# Patient Record
Sex: MALE | Race: WHITE | Employment: OTHER | URBAN - METROPOLITAN AREA
[De-identification: names, ages, dates, MRNs, and addresses within clinical notes are randomized per-mention and may not be internally consistent; named-entity substitution may affect disease eponyms.]

---

## 2020-08-31 ENCOUNTER — APPOINTMENT (OUTPATIENT)
Dept: ULTRASOUND IMAGING | Facility: CLINIC | Age: 75
DRG: 728 | End: 2020-08-31
Attending: NURSE PRACTITIONER
Payer: MEDICARE

## 2020-08-31 ENCOUNTER — APPOINTMENT (OUTPATIENT)
Dept: CT IMAGING | Facility: CLINIC | Age: 75
DRG: 728 | End: 2020-08-31
Attending: NURSE PRACTITIONER
Payer: MEDICARE

## 2020-08-31 ENCOUNTER — HOSPITAL ENCOUNTER (INPATIENT)
Facility: CLINIC | Age: 75
LOS: 2 days | Discharge: HOME | DRG: 728 | End: 2020-09-02
Attending: NURSE PRACTITIONER | Admitting: STUDENT IN AN ORGANIZED HEALTH CARE EDUCATION/TRAINING PROGRAM
Payer: MEDICARE

## 2020-08-31 DIAGNOSIS — N49.2 SCROTAL ABSCESS: ICD-10-CM

## 2020-08-31 DIAGNOSIS — N17.9 ACUTE KIDNEY INJURY (H): ICD-10-CM

## 2020-08-31 DIAGNOSIS — A41.9 SEPSIS (H): ICD-10-CM

## 2020-08-31 DIAGNOSIS — I95.9 HYPOTENSION: ICD-10-CM

## 2020-08-31 PROBLEM — Z86.007 HISTORY OF SQUAMOUS CELL CARCINOMA IN SITU (SCCIS) OF SKIN: Status: ACTIVE | Noted: 2017-08-30

## 2020-08-31 PROBLEM — F33.0 MAJOR DEPRESSIVE DISORDER, RECURRENT EPISODE, MILD (H): Status: ACTIVE | Noted: 2018-01-03

## 2020-08-31 LAB
ALBUMIN UR-MCNC: NEGATIVE MG/DL
ANION GAP SERPL CALCULATED.3IONS-SCNC: 7 MMOL/L (ref 3–14)
APPEARANCE UR: CLEAR
BACTERIA #/AREA URNS HPF: ABNORMAL /HPF
BASOPHILS # BLD AUTO: 0.1 10E9/L (ref 0–0.2)
BASOPHILS NFR BLD AUTO: 0.7 %
BILIRUB UR QL STRIP: NEGATIVE
BUN SERPL-MCNC: 19 MG/DL (ref 7–30)
CALCIUM SERPL-MCNC: 9.8 MG/DL (ref 8.5–10.1)
CHLORIDE SERPL-SCNC: 109 MMOL/L (ref 94–109)
CO2 SERPL-SCNC: 26 MMOL/L (ref 20–32)
COLOR UR AUTO: YELLOW
CREAT SERPL-MCNC: 1.31 MG/DL (ref 0.66–1.25)
CRP SERPL-MCNC: 15.6 MG/L (ref 0–8)
DIFFERENTIAL METHOD BLD: ABNORMAL
EOSINOPHIL # BLD AUTO: 0.1 10E9/L (ref 0–0.7)
EOSINOPHIL NFR BLD AUTO: 1.2 %
ERYTHROCYTE [DISTWIDTH] IN BLOOD BY AUTOMATED COUNT: 12.4 % (ref 10–15)
ERYTHROCYTE [SEDIMENTATION RATE] IN BLOOD BY WESTERGREN METHOD: 49 MM/H (ref 0–20)
GFR SERPL CREATININE-BSD FRML MDRD: 53 ML/MIN/{1.73_M2}
GLUCOSE BLDC GLUCOMTR-MCNC: 147 MG/DL (ref 70–99)
GLUCOSE SERPL-MCNC: 151 MG/DL (ref 70–99)
GLUCOSE UR STRIP-MCNC: NEGATIVE MG/DL
HCT VFR BLD AUTO: 40.6 % (ref 40–53)
HGB BLD-MCNC: 13.9 G/DL (ref 13.3–17.7)
HGB UR QL STRIP: NEGATIVE
IMM GRANULOCYTES # BLD: 0 10E9/L (ref 0–0.4)
IMM GRANULOCYTES NFR BLD: 0.5 %
INTERPRETATION ECG - MUSE: NORMAL
KETONES UR STRIP-MCNC: 5 MG/DL
LACTATE BLD-SCNC: 2.2 MMOL/L (ref 0.7–2)
LACTATE BLD-SCNC: 3 MMOL/L (ref 0.7–2)
LEUKOCYTE ESTERASE UR QL STRIP: NEGATIVE
LYMPHOCYTES # BLD AUTO: 1.4 10E9/L (ref 0.8–5.3)
LYMPHOCYTES NFR BLD AUTO: 18.7 %
MCH RBC QN AUTO: 32.7 PG (ref 26.5–33)
MCHC RBC AUTO-ENTMCNC: 34.2 G/DL (ref 31.5–36.5)
MCV RBC AUTO: 96 FL (ref 78–100)
MONOCYTES # BLD AUTO: 1 10E9/L (ref 0–1.3)
MONOCYTES NFR BLD AUTO: 13 %
MUCOUS THREADS #/AREA URNS LPF: PRESENT /LPF
NEUTROPHILS # BLD AUTO: 4.8 10E9/L (ref 1.6–8.3)
NEUTROPHILS NFR BLD AUTO: 65.9 %
NITRATE UR QL: NEGATIVE
NRBC # BLD AUTO: 0 10*3/UL
NRBC BLD AUTO-RTO: 0 /100
PH UR STRIP: 5 PH (ref 5–7)
PLATELET # BLD AUTO: 247 10E9/L (ref 150–450)
POTASSIUM SERPL-SCNC: 4.4 MMOL/L (ref 3.4–5.3)
PROCALCITONIN SERPL-MCNC: 0.11 NG/ML
RBC # BLD AUTO: 4.25 10E12/L (ref 4.4–5.9)
RBC #/AREA URNS AUTO: 1 /HPF (ref 0–2)
SODIUM SERPL-SCNC: 142 MMOL/L (ref 133–144)
SOURCE: ABNORMAL
SP GR UR STRIP: 1.02 (ref 1–1.03)
UROBILINOGEN UR STRIP-MCNC: NORMAL MG/DL (ref 0–2)
WBC # BLD AUTO: 7.3 10E9/L (ref 4–11)
WBC #/AREA URNS AUTO: 4 /HPF (ref 0–5)

## 2020-08-31 PROCEDURE — BW211ZZ COMPUTERIZED TOMOGRAPHY (CT SCAN) OF ABDOMEN AND PELVIS USING LOW OSMOLAR CONTRAST: ICD-10-PCS | Performed by: NURSE PRACTITIONER

## 2020-08-31 PROCEDURE — 25000128 H RX IP 250 OP 636: Performed by: NURSE PRACTITIONER

## 2020-08-31 PROCEDURE — 81001 URINALYSIS AUTO W/SCOPE: CPT | Performed by: NURSE PRACTITIONER

## 2020-08-31 PROCEDURE — 00000146 ZZHCL STATISTIC GLUCOSE BY METER IP

## 2020-08-31 PROCEDURE — 25000125 ZZHC RX 250: Performed by: NURSE PRACTITIONER

## 2020-08-31 PROCEDURE — 96375 TX/PRO/DX INJ NEW DRUG ADDON: CPT

## 2020-08-31 PROCEDURE — 96361 HYDRATE IV INFUSION ADD-ON: CPT

## 2020-08-31 PROCEDURE — 25800030 ZZH RX IP 258 OP 636: Performed by: NURSE PRACTITIONER

## 2020-08-31 PROCEDURE — 85652 RBC SED RATE AUTOMATED: CPT | Performed by: NURSE PRACTITIONER

## 2020-08-31 PROCEDURE — 25800030 ZZH RX IP 258 OP 636: Performed by: STUDENT IN AN ORGANIZED HEALTH CARE EDUCATION/TRAINING PROGRAM

## 2020-08-31 PROCEDURE — 74177 CT ABD & PELVIS W/CONTRAST: CPT

## 2020-08-31 PROCEDURE — C9803 HOPD COVID-19 SPEC COLLECT: HCPCS

## 2020-08-31 PROCEDURE — 83605 ASSAY OF LACTIC ACID: CPT | Performed by: NURSE PRACTITIONER

## 2020-08-31 PROCEDURE — 25000132 ZZH RX MED GY IP 250 OP 250 PS 637: Performed by: NURSE PRACTITIONER

## 2020-08-31 PROCEDURE — 80048 BASIC METABOLIC PNL TOTAL CA: CPT | Performed by: NURSE PRACTITIONER

## 2020-08-31 PROCEDURE — 96365 THER/PROPH/DIAG IV INF INIT: CPT | Mod: 59

## 2020-08-31 PROCEDURE — 12000000 ZZH R&B MED SURG/OB

## 2020-08-31 PROCEDURE — 85025 COMPLETE CBC W/AUTO DIFF WBC: CPT | Performed by: NURSE PRACTITIONER

## 2020-08-31 PROCEDURE — 10060 I&D ABSCESS SIMPLE/SINGLE: CPT

## 2020-08-31 PROCEDURE — 93005 ELECTROCARDIOGRAM TRACING: CPT

## 2020-08-31 PROCEDURE — 25000128 H RX IP 250 OP 636: Performed by: STUDENT IN AN ORGANIZED HEALTH CARE EDUCATION/TRAINING PROGRAM

## 2020-08-31 PROCEDURE — 76870 US EXAM SCROTUM: CPT

## 2020-08-31 PROCEDURE — 84145 PROCALCITONIN (PCT): CPT | Performed by: NURSE PRACTITIONER

## 2020-08-31 PROCEDURE — 87040 BLOOD CULTURE FOR BACTERIA: CPT | Performed by: NURSE PRACTITIONER

## 2020-08-31 PROCEDURE — 86140 C-REACTIVE PROTEIN: CPT | Performed by: NURSE PRACTITIONER

## 2020-08-31 PROCEDURE — 99285 EMERGENCY DEPT VISIT HI MDM: CPT | Mod: 25

## 2020-08-31 PROCEDURE — 99223 1ST HOSP IP/OBS HIGH 75: CPT | Mod: AI | Performed by: STUDENT IN AN ORGANIZED HEALTH CARE EDUCATION/TRAINING PROGRAM

## 2020-08-31 PROCEDURE — U0003 INFECTIOUS AGENT DETECTION BY NUCLEIC ACID (DNA OR RNA); SEVERE ACUTE RESPIRATORY SYNDROME CORONAVIRUS 2 (SARS-COV-2) (CORONAVIRUS DISEASE [COVID-19]), AMPLIFIED PROBE TECHNIQUE, MAKING USE OF HIGH THROUGHPUT TECHNOLOGIES AS DESCRIBED BY CMS-2020-01-R: HCPCS | Performed by: NURSE PRACTITIONER

## 2020-08-31 RX ORDER — MULTIPLE VITAMINS W/ MINERALS TAB 9MG-400MCG
1 TAB ORAL DAILY
COMMUNITY

## 2020-08-31 RX ORDER — PIPERACILLIN SODIUM, TAZOBACTAM SODIUM 4; .5 G/20ML; G/20ML
4.5 INJECTION, POWDER, LYOPHILIZED, FOR SOLUTION INTRAVENOUS ONCE
Status: COMPLETED | OUTPATIENT
Start: 2020-08-31 | End: 2020-08-31

## 2020-08-31 RX ORDER — ASPIRIN 81 MG/1
81 TABLET ORAL DAILY
COMMUNITY

## 2020-08-31 RX ORDER — ACETAMINOPHEN 325 MG/1
650 TABLET ORAL EVERY 4 HOURS PRN
Status: DISCONTINUED | OUTPATIENT
Start: 2020-08-31 | End: 2020-09-02 | Stop reason: HOSPADM

## 2020-08-31 RX ORDER — SODIUM CHLORIDE, SODIUM LACTATE, POTASSIUM CHLORIDE, CALCIUM CHLORIDE 600; 310; 30; 20 MG/100ML; MG/100ML; MG/100ML; MG/100ML
INJECTION, SOLUTION INTRAVENOUS CONTINUOUS
Status: DISCONTINUED | OUTPATIENT
Start: 2020-08-31 | End: 2020-09-02 | Stop reason: HOSPADM

## 2020-08-31 RX ORDER — FERROUS GLUCONATE 324(37.5)
324 TABLET ORAL DAILY
COMMUNITY

## 2020-08-31 RX ORDER — PROCHLORPERAZINE MALEATE 5 MG
5 TABLET ORAL EVERY 6 HOURS PRN
Status: DISCONTINUED | OUTPATIENT
Start: 2020-08-31 | End: 2020-09-02 | Stop reason: HOSPADM

## 2020-08-31 RX ORDER — IBUPROFEN 200 MG
400 TABLET ORAL 2 TIMES DAILY WITH MEALS
COMMUNITY

## 2020-08-31 RX ORDER — HYDROMORPHONE HYDROCHLORIDE 1 MG/ML
0.5 INJECTION, SOLUTION INTRAMUSCULAR; INTRAVENOUS; SUBCUTANEOUS ONCE
Status: DISCONTINUED | OUTPATIENT
Start: 2020-08-31 | End: 2020-08-31

## 2020-08-31 RX ORDER — MULTIVIT-MIN/IRON/FOLIC ACID/K 18-600-40
1000 CAPSULE ORAL DAILY
COMMUNITY

## 2020-08-31 RX ORDER — ESCITALOPRAM OXALATE 10 MG/1
10 TABLET ORAL DAILY
COMMUNITY
Start: 2020-07-15

## 2020-08-31 RX ORDER — DEXTROSE MONOHYDRATE 25 G/50ML
25-50 INJECTION, SOLUTION INTRAVENOUS
Status: DISCONTINUED | OUTPATIENT
Start: 2020-08-31 | End: 2020-09-02 | Stop reason: HOSPADM

## 2020-08-31 RX ORDER — LIDOCAINE 40 MG/G
CREAM TOPICAL
Status: DISCONTINUED | OUTPATIENT
Start: 2020-08-31 | End: 2020-09-02 | Stop reason: HOSPADM

## 2020-08-31 RX ORDER — NALOXONE HYDROCHLORIDE 0.4 MG/ML
.1-.4 INJECTION, SOLUTION INTRAMUSCULAR; INTRAVENOUS; SUBCUTANEOUS
Status: DISCONTINUED | OUTPATIENT
Start: 2020-08-31 | End: 2020-09-02 | Stop reason: HOSPADM

## 2020-08-31 RX ORDER — ONDANSETRON 4 MG/1
4 TABLET, ORALLY DISINTEGRATING ORAL EVERY 6 HOURS PRN
Status: DISCONTINUED | OUTPATIENT
Start: 2020-08-31 | End: 2020-09-02 | Stop reason: HOSPADM

## 2020-08-31 RX ORDER — OXYCODONE HYDROCHLORIDE 5 MG/1
5-10 TABLET ORAL EVERY 4 HOURS PRN
Status: DISCONTINUED | OUTPATIENT
Start: 2020-08-31 | End: 2020-09-02 | Stop reason: HOSPADM

## 2020-08-31 RX ORDER — PIPERACILLIN SODIUM, TAZOBACTAM SODIUM 3; .375 G/15ML; G/15ML
3.38 INJECTION, POWDER, LYOPHILIZED, FOR SOLUTION INTRAVENOUS EVERY 6 HOURS
Status: DISCONTINUED | OUTPATIENT
Start: 2020-08-31 | End: 2020-09-01

## 2020-08-31 RX ORDER — IOPAMIDOL 755 MG/ML
111 INJECTION, SOLUTION INTRAVASCULAR ONCE
Status: COMPLETED | OUTPATIENT
Start: 2020-08-31 | End: 2020-08-31

## 2020-08-31 RX ORDER — ATORVASTATIN CALCIUM 10 MG/1
10 TABLET, FILM COATED ORAL DAILY
COMMUNITY
Start: 2020-01-22

## 2020-08-31 RX ORDER — ONDANSETRON 2 MG/ML
4 INJECTION INTRAMUSCULAR; INTRAVENOUS EVERY 6 HOURS PRN
Status: DISCONTINUED | OUTPATIENT
Start: 2020-08-31 | End: 2020-09-02 | Stop reason: HOSPADM

## 2020-08-31 RX ORDER — PROCHLORPERAZINE 25 MG
12.5 SUPPOSITORY, RECTAL RECTAL EVERY 12 HOURS PRN
Status: DISCONTINUED | OUTPATIENT
Start: 2020-08-31 | End: 2020-09-02 | Stop reason: HOSPADM

## 2020-08-31 RX ORDER — ACETAMINOPHEN 500 MG
1000 TABLET ORAL ONCE
Status: COMPLETED | OUTPATIENT
Start: 2020-08-31 | End: 2020-08-31

## 2020-08-31 RX ORDER — NICOTINE POLACRILEX 4 MG
15-30 LOZENGE BUCCAL
Status: DISCONTINUED | OUTPATIENT
Start: 2020-08-31 | End: 2020-09-02 | Stop reason: HOSPADM

## 2020-08-31 RX ADMIN — IOPAMIDOL 111 ML: 755 INJECTION, SOLUTION INTRAVENOUS at 17:30

## 2020-08-31 RX ADMIN — ACETAMINOPHEN 1000 MG: 500 TABLET, FILM COATED ORAL at 15:34

## 2020-08-31 RX ADMIN — SODIUM CHLORIDE 1000 ML: 9 INJECTION, SOLUTION INTRAVENOUS at 17:58

## 2020-08-31 RX ADMIN — SODIUM CHLORIDE, POTASSIUM CHLORIDE, SODIUM LACTATE AND CALCIUM CHLORIDE: 600; 310; 30; 20 INJECTION, SOLUTION INTRAVENOUS at 23:29

## 2020-08-31 RX ADMIN — PIPERACILLIN SODIUM AND TAZOBACTAM SODIUM 3.38 G: 3; .375 INJECTION, POWDER, LYOPHILIZED, FOR SOLUTION INTRAVENOUS at 22:41

## 2020-08-31 RX ADMIN — PIPERACILLIN SODIUM AND TAZOBACTAM SODIUM 4.5 G: 4; .5 INJECTION, POWDER, LYOPHILIZED, FOR SOLUTION INTRAVENOUS at 16:27

## 2020-08-31 RX ADMIN — SODIUM CHLORIDE 72 ML: 9 INJECTION, SOLUTION INTRAVENOUS at 17:29

## 2020-08-31 RX ADMIN — SODIUM CHLORIDE 1000 ML: 9 INJECTION, SOLUTION INTRAVENOUS at 15:50

## 2020-08-31 RX ADMIN — VANCOMYCIN HYDROCHLORIDE 2000 MG: 5 INJECTION, POWDER, LYOPHILIZED, FOR SOLUTION INTRAVENOUS at 17:11

## 2020-08-31 SDOH — HEALTH STABILITY: MENTAL HEALTH: HOW OFTEN DO YOU HAVE A DRINK CONTAINING ALCOHOL?: 2-4 TIMES A MONTH

## 2020-08-31 ASSESSMENT — ENCOUNTER SYMPTOMS
MYALGIAS: 0
DIFFICULTY URINATING: 0
FREQUENCY: 0
DYSURIA: 0
HEMATURIA: 0
FEVER: 0

## 2020-08-31 ASSESSMENT — ACTIVITIES OF DAILY LIVING (ADL)
AMBULATION: 0-->INDEPENDENT
BATHING: 0-->INDEPENDENT
TOILETING: 0-->INDEPENDENT
FALL_HISTORY_WITHIN_LAST_SIX_MONTHS: NO
RETIRED_EATING: 0-->INDEPENDENT
RETIRED_COMMUNICATION: 0-->UNDERSTANDS/COMMUNICATES WITHOUT DIFFICULTY
TRANSFERRING: 0-->INDEPENDENT
COGNITION: 0 - NO COGNITION ISSUES REPORTED
DRESS: 0-->INDEPENDENT
SWALLOWING: 0-->SWALLOWS FOODS/LIQUIDS WITHOUT DIFFICULTY

## 2020-08-31 NOTE — ED TRIAGE NOTES
Wound check in the groin area. It looks worse today and primary would like it to be evaluated for increase risk of infection, I and D or IV abx.

## 2020-08-31 NOTE — ED PROVIDER NOTES
"  History     Chief Complaint:  Wound Check      HPI   Luc Amaya is a 74 year old male with a history of DM type 2 who presents for evaluation of a wound check. Patient reports having a sore on the left side of his scrotum that is painful that started two weeks ago. He presented in clinic four days ago and was started on Augmentin. Today he reports bleeding and drainage from the wound, prompting him to present to the ED. He denies fever, myalgias, and urinary changes.    Allergies:  No known drug allergies    Medications:    Augmentin  Metformin  Lexapro  Atorvastatin    Past Medical History:    HLD  Cellulitis of scrotum  SCCIS of skin  DM type 2  Obesity  GERD  MDD  Presbyacusis of both ears  Kidney stones  Erectile dysfunction    Past Surgical History:    Tonsillectomy  Right thumb pinning    Family History:    Diabetes - father  AAA - father  CAD - mother  Stroke - father  Depression - mother    Social History:  Smoking status: former smoker  Alcohol use: yes  Drug use: no  The patient presents to the emergency department by himself.  Marital Status:       Review of Systems   Constitutional: Negative for fever.   Genitourinary: Positive for scrotal swelling (Pain). Negative for decreased urine volume, difficulty urinating, dysuria, enuresis, frequency, hematuria and urgency.   Musculoskeletal: Negative for myalgias.   All other systems reviewed and are negative.      Physical Exam     Patient Vitals for the past 24 hrs:   BP Temp Temp src Pulse Resp SpO2 Height Weight   08/31/20 1715 93/65 -- -- -- -- 95 % -- --   08/31/20 1710 101/65 -- -- 77 -- 94 % -- --   08/31/20 1615 106/71 -- -- 80 -- 94 % -- --   08/31/20 1600 98/65 -- -- 81 -- 93 % -- --   08/31/20 1555 -- -- -- -- -- 93 % -- --   08/31/20 1550 -- -- -- -- -- 93 % -- --   08/31/20 1545 (!) 87/65 -- -- 88 -- 94 % -- --   08/31/20 1540 -- -- -- -- -- 95 % -- --   08/31/20 1535 (!) 80/62 -- -- 95 -- 94 % 1.676 m (5' 6\") --   08/31/20 9213 (!) " 80/62 -- -- -- -- -- -- --   08/31/20 1445 -- 97.5  F (36.4  C) Oral -- -- -- -- --   08/31/20 1443 -- -- -- -- -- -- -- 99.8 kg (220 lb)   08/31/20 1442 102/47 -- -- -- -- -- -- --   08/31/20 1441 -- -- Oral 95 14 98 % -- --       Physical Exam  Nursing notes reviewed. Vitals reviewed.  General: Alert. Well kept.  Eyes:  Conjunctiva non-injected, non-icteric.  Neck/Throat: Moist mucous membranes. Normal voice.  Cardiac: Regular rhythm. Normal heart sounds.  Pulmonary: Clear and equal breath sounds bilaterally.   Abdomen: soft and non-tender  : evaluation performed in presence of chaperone. Circumcized, no penile discharge or rash/sores. No right testicular/scrotal tenderness. 3-4cm firm induration left upper scrotum and left perineal area with no left testicle pain. Tenderness to palpation along left epididymus.   Musculoskeletal: Normal gross range of motion of all 4 extremities.   Neurological: Alert and oriented x4.   Skin: Warm and dry. Normal appearance of visualized exposed skin without rashes or petechiae.  Psych: Affect normal. Good eye contact.      Emergency Department Course   ECG (16:07:00):  Rate 78 bpm. TX interval 148. QRS duration 56. QT/QTc 394/449. P-R-T axes 44 38 75. Normal sinus rhythm. Normal ECG. Interpreted at 1610 by Jonah Booth MD.    Imaging:  Radiographic findings were communicated with the patient who voiced understanding of the findings.    CT Abdomen Pelvis w Contrast   Final Result   IMPRESSION:    1.  Probable left scrotal wall abscess. No gas is noted in the soft   tissues to suggest gangrenous infection. Findings are consistent with   recent testicular ultrasound findings. No intra-abdominal or   intraperitoneal abnormality associated with this infection.      2.  Prominent upper abdominal lymph nodes and borderline-enlarged left   inguinal lymph node. Inguinal lymph node is likely reactive in nature.      3.  Cholelithiasis. Probable collapsing cyst lateral segment left    hepatic lobe.      4.  Simple-appearing cyst left renal cortex. No further follow-up   required.      5.  Colonic constipation but no bowel obstruction or diverticulitis.      VU GAMEZ MD      US Testicular & Scrotum w Doppler Ltd   Final Result   IMPRESSION:     1. Normal testicles without torsion or mass. No epididymitis or   hydroceles.   2. Probable small abscess lateral left scrotal wall with possible   communication to the skin surface.      VU GAMEZ MD          Laboratory:  CBC: WBC: 7.3, HGB: 13.9, PLT: 247  BMP: Glucose 151 (H), GFR 53 (L), o/w WNL (Creatinine: 1.31 (H))  1526 Lactic acid: 3.0 (H)  1744 Lactic acid: 2.2 (H)  Blood cultures (X2): Pending  UA: Pending  CRP inflammation: 15.6 (H)  Erythrocyte sedimentation rate auto: 49 (H)  Procalcitonin: 0.11  Asymptomatic COVID-19 PCR: Pending    Interventions:  1535 Tylenol 1000 mg Oral  1550 NS 1000 mL IV  1627 Zosyn 4.5 g IV  1711 vancomycin 2000 mg IV  1758 NS 1000 mL IV      Procedure: Incision and Drainage   Performed by: Dr. Jonah Booth    LOCATIONS:  Left lateral scrotal wall     ANESTHESIA:  Local field block using Lidocaine 1% plain, total of 4 mLs     PREPARATION:  Cleansed with Normal Saline and Betadine     PROCEDURE:  Area was incised with # 11 Blade ([place]) with a Single Straight incision.  Wound treatment included Deloculation and Purulent Drainage.  Packing consisted of Plain Gauze.  Appropriate dressing was applied to cover the area.    Patient Status:        Patient tolerated the procedure well. There were no complications.      Emergency Department Course:  Nursing notes and vitals reviewed. (1513) I performed an exam of the patient as documented above.     IV inserted. Medicine administered as documented above. Blood drawn. Urine sample obtained. COVID swab obtained. This was sent to the lab for further testing, results above.     The patient was sent for a US while in the emergency department, findings above.     An EKG was  recorded. Results as noted above.     1707 I spoke with Dr. Vini Paz of [facility] and discussed the patient    1845 I rechecked the patient and discussed the results of his workup thus far.     1900 I spoke with Dr. Vini Paz of [facility] and discussed the patient who recommended local I&D be completed in the ED.    1900  I discussed the patient with Dr. Booth in shared service.     1910  I consulted with Dr. Poole of the hospitalist services. He is in agreement to accept the patient for admission.    Findings and plan explained to the Patient who consents to admission. Discussed the patient with Dr. Perez, who will admit the patient to a medical bed for further monitoring, evaluation, and treatment.    Impression & Plan    CMS Diagnoses: The Lactic acid level is elevated due to Infection, at this time there is no sign of severe sepsis or septic shock. Resulted at 1538.       Medical Decision Making:  Luc Amaya is a 74 year old male with a history of DM type 2 who presents for evaluation of a wound check.  On exam he has a area of induration that is currently draining on the left lateral scrotum.  Initial WBC 7.3, but lactate is 3.0 and patient has hypotension with blood pressure in the upper 80s systolic.  Blood pressure improved to greater than 100 systolic with IV fluids, but returned to the 90s systolic and second liter of IV fluids was given with blood pressure again greater than 100 systolic.  Blood cultures obtained and the patient was initiated on Zosyn and vancomycin.  Repeat lactate returns at 2.2.  Ultrasound concerning for scrotal abscess.  I spoke with Dr. Paz from urology who recommended CT scan.  This returned showing focal inflammation noted in the left scrotal wall extending into the perineal region. 2.4 cm fluid collection noted consistent with a scrotal wall/perineal abscess.  I again spoke with Dr. Paz who reviewed imaging and recommended I&D and admission.  I & D performed  without complication (see procedure note above).  Urology will plan to see the patient inpatient.  I spoke with Dr. Poole,  hospitalist who accepts for admission.     Covid-19  Luc Amaya was evaluated during a global COVID-19 pandemic, which necessitated consideration that the patient might be at risk for infection with the SARS-CoV-2 virus that causes COVID-19.   Applicable protocols for evaluation were followed during the patient's care.   COVID-19 was considered as part of the patient's evaluation. The plan for testing is:  a test was obtained during this visit.    Diagnosis:    ICD-10-CM    1. Acute kidney injury (H)  N17.9 Asymptomatic COVID-19 Virus (Coronavirus) by PCR     CRP inflammation     CRP inflammation     Procalcitonin     Procalcitonin     Erythrocyte sedimentation rate auto     Erythrocyte sedimentation rate auto     Lactic acid     CANCELED: CRP inflammation     CANCELED: Erythrocyte sedimentation rate auto     CANCELED: Procalcitonin   2. Hypotension  I95.9    3. Sepsis (H)  A41.9    4. Scrotal abscess  N49.2        Disposition:  Admitted to the hospital  Walter Rueda  8/31/2020    EMERGENCY DEPARTMENT  Scribe Disclosure:  I, Walter Rueda, am serving as a scribe at 3:13 PM on 8/31/2020 to document services personally performed by Rosie Malone CNP based on my observations and the provider's statements to me.        Rosie Maloen CNP  08/31/20 1924

## 2020-08-31 NOTE — ED NOTES
[facility] [place]  ED Nurse Handoff Report    ED Chief complaint: Wound Check      ED Diagnosis:   Final diagnoses:   Acute kidney injury (H)       Code Status: To be addressed by admitting provider    Allergies: No Known Allergies    Patient Story: Pt comes from clinic for wound in groin area. Pt reports wound for 2 weeks and PCP thinks it has gotten worse.     Focused Assessment:  A&Ox4. Denies sob, cough, cp, n/v/d, pain, fevers. Wound to L groin and involvement with scrotum. Pt reports serosangeous drainage beginning today. Intitally hypotensive, after bolus of NS, pt vitally stable. Independent with ambulation and cares. Calm, cooperative and resting on cart.     Labs Ordered and Resulted from Time of ED Arrival Up to the Time of Departure from the ED   BASIC METABOLIC PANEL - Abnormal; Notable for the following components:       Result Value    Glucose 151 (*)     Creatinine 1.31 (*)     GFR Estimate 53 (*)     All other components within normal limits   CBC WITH PLATELETS DIFFERENTIAL - Abnormal; Notable for the following components:    RBC Count 4.25 (*)     All other components within normal limits   LACTIC ACID WHOLE BLOOD - Abnormal; Notable for the following components:    Lactic Acid 3.0 (*)     All other components within normal limits   CRP INFLAMMATION - Abnormal; Notable for the following components:    CRP Inflammation 15.6 (*)     All other components within normal limits   ERYTHROCYTE SEDIMENTATION RATE AUTO - Abnormal; Notable for the following components:    Sed Rate 49 (*)     All other components within normal limits   LACTIC ACID WHOLE BLOOD - Abnormal; Notable for the following components:    Lactic Acid 2.2 (*)     All other components within normal limits   COVID-19 VIRUS (CORONAVIRUS) BY PCR   PROCALCITONIN   ROUTINE UA WITH MICROSCOPIC REFLEX TO CULTURE   PERIPHERAL IV CATHETER   BLOOD CULTURE   BLOOD CULTURE       CT Abdomen Pelvis w Contrast   Final Result   IMPRESSION:     1.  Probable left scrotal wall abscess. No gas is noted in the soft   tissues to suggest gangrenous infection. Findings are consistent with   recent testicular ultrasound findings. No intra-abdominal or   intraperitoneal abnormality associated with this infection.      2.  Prominent upper abdominal lymph nodes and borderline-enlarged left   inguinal lymph node. Inguinal lymph node is likely reactive in nature.      3.  Cholelithiasis. Probable collapsing cyst lateral segment left   hepatic lobe.      4.  Simple-appearing cyst left renal cortex. No further follow-up   required.      5.  Colonic constipation but no bowel obstruction or diverticulitis.      VU GAMEZ MD      US Testicular & Scrotum w Doppler Ltd   Final Result   IMPRESSION:     1. Normal testicles without torsion or mass. No epididymitis or   hydroceles.   2. Probable small abscess lateral left scrotal wall with possible   communication to the skin surface.      VU GAMEZ MD          Treatments and/or interventions provided: 2L NS Bolus, Zosyn, tylenol, vanco, asymptomatic swab, I&D of wound  Patient's response to treatments and/or interventions: resting on cart    To be done/followed up on inpatient unit:  Continue with plan of care per admitting MD.    Does this patient have any cognitive concerns?: None    Activity level - Baseline/Home:  Independent  Activity Level - Current:   Independent    Patient's Preferred language: English   Needed?: No    Isolation: None  Infection: Not Applicable  Bariatric?: No    Vital Signs:   Vitals:    08/31/20 1800 08/31/20 1820 08/31/20 1840 08/31/20 1900   BP: 103/72 105/67 99/60 106/56   Pulse: 71 72 74 75   Resp:       Temp:       TempSrc:       SpO2: 95% 94% 94%    Weight:       Height:           Cardiac Rhythm:     Was the PSS-3 completed:   Yes  What interventions are required if any?  NA             Family Comments: None present   OBS brochure/video discussed/provided to patient/family: N/A               Name of person given brochure if not patient: NA              Relationship to patient: na    For the majority of the shift this patient's behavior was Green.   Behavioral interventions performed were na.    ED NURSE PHONE NUMBER: *[phone]

## 2020-08-31 NOTE — ED NOTES
Bed: ED29  Expected date: 8/31/20  Expected time: 2:44 PM  Means of arrival:   Comments:  Ready for JS

## 2020-09-01 LAB
ANION GAP SERPL CALCULATED.3IONS-SCNC: 6 MMOL/L (ref 3–14)
BUN SERPL-MCNC: 12 MG/DL (ref 7–30)
CALCIUM SERPL-MCNC: 8.1 MG/DL (ref 8.5–10.1)
CHLORIDE SERPL-SCNC: 109 MMOL/L (ref 94–109)
CO2 SERPL-SCNC: 25 MMOL/L (ref 20–32)
CREAT SERPL-MCNC: 1.05 MG/DL (ref 0.66–1.25)
ERYTHROCYTE [DISTWIDTH] IN BLOOD BY AUTOMATED COUNT: 12.3 % (ref 10–15)
GFR SERPL CREATININE-BSD FRML MDRD: 69 ML/MIN/{1.73_M2}
GLUCOSE BLDC GLUCOMTR-MCNC: 108 MG/DL (ref 70–99)
GLUCOSE BLDC GLUCOMTR-MCNC: 110 MG/DL (ref 70–99)
GLUCOSE BLDC GLUCOMTR-MCNC: 124 MG/DL (ref 70–99)
GLUCOSE BLDC GLUCOMTR-MCNC: 150 MG/DL (ref 70–99)
GLUCOSE BLDC GLUCOMTR-MCNC: 161 MG/DL (ref 70–99)
GLUCOSE SERPL-MCNC: 104 MG/DL (ref 70–99)
HCT VFR BLD AUTO: 35.2 % (ref 40–53)
HGB BLD-MCNC: 12.1 G/DL (ref 13.3–17.7)
LACTATE BLD-SCNC: 1 MMOL/L (ref 0.7–2)
MCH RBC QN AUTO: 32.5 PG (ref 26.5–33)
MCHC RBC AUTO-ENTMCNC: 34.4 G/DL (ref 31.5–36.5)
MCV RBC AUTO: 95 FL (ref 78–100)
PLATELET # BLD AUTO: 221 10E9/L (ref 150–450)
POTASSIUM SERPL-SCNC: 3.8 MMOL/L (ref 3.4–5.3)
RBC # BLD AUTO: 3.72 10E12/L (ref 4.4–5.9)
SODIUM SERPL-SCNC: 140 MMOL/L (ref 133–144)
WBC # BLD AUTO: 5 10E9/L (ref 4–11)

## 2020-09-01 PROCEDURE — 99221 1ST HOSP IP/OBS SF/LOW 40: CPT | Performed by: UROLOGY

## 2020-09-01 PROCEDURE — 83605 ASSAY OF LACTIC ACID: CPT | Performed by: STUDENT IN AN ORGANIZED HEALTH CARE EDUCATION/TRAINING PROGRAM

## 2020-09-01 PROCEDURE — 12000000 ZZH R&B MED SURG/OB

## 2020-09-01 PROCEDURE — 85027 COMPLETE CBC AUTOMATED: CPT | Performed by: STUDENT IN AN ORGANIZED HEALTH CARE EDUCATION/TRAINING PROGRAM

## 2020-09-01 PROCEDURE — 25000132 ZZH RX MED GY IP 250 OP 250 PS 637: Mod: GY | Performed by: STUDENT IN AN ORGANIZED HEALTH CARE EDUCATION/TRAINING PROGRAM

## 2020-09-01 PROCEDURE — 80048 BASIC METABOLIC PNL TOTAL CA: CPT | Performed by: STUDENT IN AN ORGANIZED HEALTH CARE EDUCATION/TRAINING PROGRAM

## 2020-09-01 PROCEDURE — 36415 COLL VENOUS BLD VENIPUNCTURE: CPT | Performed by: STUDENT IN AN ORGANIZED HEALTH CARE EDUCATION/TRAINING PROGRAM

## 2020-09-01 PROCEDURE — 00000146 ZZHCL STATISTIC GLUCOSE BY METER IP

## 2020-09-01 PROCEDURE — 25000128 H RX IP 250 OP 636: Performed by: STUDENT IN AN ORGANIZED HEALTH CARE EDUCATION/TRAINING PROGRAM

## 2020-09-01 PROCEDURE — 25800030 ZZH RX IP 258 OP 636: Performed by: STUDENT IN AN ORGANIZED HEALTH CARE EDUCATION/TRAINING PROGRAM

## 2020-09-01 PROCEDURE — 99232 SBSQ HOSP IP/OBS MODERATE 35: CPT | Performed by: STUDENT IN AN ORGANIZED HEALTH CARE EDUCATION/TRAINING PROGRAM

## 2020-09-01 RX ORDER — PIPERACILLIN SODIUM, TAZOBACTAM SODIUM 4; .5 G/20ML; G/20ML
4.5 INJECTION, POWDER, LYOPHILIZED, FOR SOLUTION INTRAVENOUS EVERY 6 HOURS
Status: DISCONTINUED | OUTPATIENT
Start: 2020-09-01 | End: 2020-09-02 | Stop reason: HOSPADM

## 2020-09-01 RX ORDER — ASPIRIN 81 MG/1
81 TABLET ORAL DAILY
Status: DISCONTINUED | OUTPATIENT
Start: 2020-09-02 | End: 2020-09-02 | Stop reason: HOSPADM

## 2020-09-01 RX ADMIN — ACETAMINOPHEN 650 MG: 325 TABLET ORAL at 08:46

## 2020-09-01 RX ADMIN — VANCOMYCIN HYDROCHLORIDE 2000 MG: 5 INJECTION, POWDER, LYOPHILIZED, FOR SOLUTION INTRAVENOUS at 18:19

## 2020-09-01 RX ADMIN — PIPERACILLIN SODIUM AND TAZOBACTAM SODIUM 4.5 G: 4; .5 INJECTION, POWDER, LYOPHILIZED, FOR SOLUTION INTRAVENOUS at 22:40

## 2020-09-01 RX ADMIN — PIPERACILLIN SODIUM AND TAZOBACTAM SODIUM 3.38 G: 3; .375 INJECTION, POWDER, LYOPHILIZED, FOR SOLUTION INTRAVENOUS at 05:44

## 2020-09-01 RX ADMIN — VANCOMYCIN HYDROCHLORIDE 2000 MG: 5 INJECTION, POWDER, LYOPHILIZED, FOR SOLUTION INTRAVENOUS at 06:18

## 2020-09-01 RX ADMIN — ACETAMINOPHEN 650 MG: 325 TABLET ORAL at 13:39

## 2020-09-01 RX ADMIN — ACETAMINOPHEN 650 MG: 325 TABLET ORAL at 21:33

## 2020-09-01 RX ADMIN — PIPERACILLIN SODIUM AND TAZOBACTAM SODIUM 4.5 G: 4; .5 INJECTION, POWDER, LYOPHILIZED, FOR SOLUTION INTRAVENOUS at 17:22

## 2020-09-01 RX ADMIN — PIPERACILLIN SODIUM AND TAZOBACTAM SODIUM 4.5 G: 4; .5 INJECTION, POWDER, LYOPHILIZED, FOR SOLUTION INTRAVENOUS at 11:43

## 2020-09-01 RX ADMIN — SODIUM CHLORIDE, POTASSIUM CHLORIDE, SODIUM LACTATE AND CALCIUM CHLORIDE: 600; 310; 30; 20 INJECTION, SOLUTION INTRAVENOUS at 15:46

## 2020-09-01 ASSESSMENT — ACTIVITIES OF DAILY LIVING (ADL)
ADLS_ACUITY_SCORE: 12

## 2020-09-01 ASSESSMENT — MIFFLIN-ST. JEOR: SCORE: 1674.75

## 2020-09-01 NOTE — PHARMACY-ADMISSION MEDICATION HISTORY
Pharmacy Medication History  Admission medication history interview status for the 8/31/2020  admission is complete. See EPIC admission navigator for prior to admission medications     Medication history sources: Patient  Medication history source reliability: Good/moderate  Adherence assessment: Good    Significant changes made to the medication list:  Added: aspirin, vitamins, augmentin, ibuprofen      Additional medication history information:   n/a    Medication reconciliation completed by provider prior to medication history? Yes    Time spent in this activity: 15 mins      Prior to Admission medications    Medication Sig Last Dose Taking? Auth Provider   amoxicillin-clavulanate (AUGMENTIN) 875-125 MG tablet Take 1 tablet by mouth 2 times daily X 10 days, prescribed on 8/27/2020 8/31/2020 at x1 Yes Unknown, Entered By History   aspirin 81 MG EC tablet Take 81 mg by mouth daily 8/31/2020 at Unknown time Yes Unknown, Entered By History   atorvastatin (LIPITOR) 10 MG tablet Take 10 mg by mouth daily  8/31/2020 at Unknown time Yes Reported, Patient   Calcium-Magnesium-Zinc 333-133-5 MG TABS per tablet Take 1 tablet by mouth daily 8/31/2020 at Unknown time Yes Unknown, Entered By History   escitalopram (LEXAPRO) 10 MG tablet Take 10 mg by mouth daily  8/31/2020 at Unknown time Yes Reported, Patient   Ferrous Gluconate 324 (37.5 Fe) MG TABS Take 324 mg by mouth daily 8/31/2020 at Unknown time Yes Unknown, Entered By History   ibuprofen (ADVIL/MOTRIN) 200 MG tablet Take 400 mg by mouth 2 times daily (with meals) 8/31/2020 at x1 Yes Unknown, Entered By History   metFORMIN (GLUCOPHAGE) 500 MG tablet Take 500 mg by mouth daily (with breakfast) 8/31/2020 at Unknown time Yes Unknown, Entered By History   metFORMIN (GLUCOPHAGE) 500 MG tablet Take 1,500 mg by mouth daily (with dinner) (3 tablets of 500mg) 8/30/2020 at Unknown time Yes Unknown, Entered By History   multivitamin w/minerals (THERA-VIT-M) tablet Take 1 tablet by  mouth daily 8/31/2020 at Unknown time Yes Unknown, Entered By History   Vitamin D, Cholecalciferol, 25 MCG (1000 UT) TABS Take 1,000 Units by mouth daily 8/31/2020 at Unknown time Yes Unknown, Entered By History     Lynn Gonzalez, PharmD

## 2020-09-01 NOTE — PROGRESS NOTES
SPIRITUAL HEALTH SERVICES Progress Note  [facility]    Visit with pt, per request in chart.  Pt did not seem to have requested a , but seemed to appreciate having a visit.  Pt talked about his current health issues, his upbringing in [facility], and pondered questions of ratna and Den[employer]ational.  Engaged in conversation and provided support.  SH team available per additional need or request.                                                                                                                                                 Clarissa Miller M.A.  Staff   Pager [phone]  Phone [phone]

## 2020-09-01 NOTE — CONSULTS
History: It is a great pleasure to be asked to see this very pleasant 74-year-old gentleman in initial consultation today.  He was admitted last night with 2 weeks of pain in the scrotum and swelling an ultrasound had been performed on the left lateral scrotal wall.  .  The lesion has started draining by the time of presentation to the emergency room..  An incision was made in the emergency room with the loculation and purulent drainage obtained.  This was then dressed.  The patient is currently on intravenous Zosyn  Results for QUENTIN MELLO (MRN [id number]) as of 9/1/2020 12:39   Ref. Range 8/31/2020 15:26 8/31/2020 17:44 9/1/2020 08:46   Lactic Acid Latest Ref Range: 0.7 - 2.0 mmol/L 3.0 (H) 2.2 (H) 1.0   Initially the lactic acid level was elevated at 3.0 but today is down to 1.01 within the normal range.  Patient is currently afebrile.  Results for QUENTIN MELLO (MRN [id number]) as of 9/1/2020 12:39   Ref. Range 8/31/2020 15:26 9/1/2020 08:46   WBC Latest Ref Range: 4.0 - 11.0 10e9/L 7.3 5.0   Hemoglobin Latest Ref Range: 13.3 - 17.7 g/dL 13.9 12.1 (L)   Hematocrit Latest Ref Range: 40.0 - 53.0 % 40.6 35.2 (L)   Platelet Count Latest Ref Range: 150 - 450 10e9/L 247 221   RBC Count Latest Ref Range: 4.4 - 5.9 10e12/L 4.25 (L) 3.72 (L)   MCV Latest Ref Range: 78 - 100 fl 96 95   MCH Latest Ref Range: 26.5 - 33.0 pg 32.7 32.5   MCHC Latest Ref Range: 31.5 - 36.5 g/dL 34.2 34.4   RDW Latest Ref Range: 10.0 - 15.0 % 12.4 12.3   The white cell count is normal    Past Medical History:   Diagnosis Date     DM (diabetes mellitus) (H)      GERD (gastroesophageal reflux disease)      Major depression        History reviewed. No pertinent surgical history.    Family History   Problem Relation Age of Onset     No Known Problems Mother      Heart Disease Father      Diabetes Father        Social History     Socioeconomic History     Marital status:      Spouse name: Not on file     Number of children: Not on file  "    Years of education: Not on file     Highest education level: Not on file   Occupational History     Not on file   Social Needs     Financial resource strain: Not on file     Food insecurity     Worry: Not on file     Inability: Not on file     Transportation needs     Medical: Not on file     Non-medical: Not on file   Tobacco Use     Smoking status: Former Smoker     Years: 40.00     Types: Cigarettes     Smokeless tobacco: Never Used   Substance and Sexual Activity     Alcohol use: Yes     Frequency: 2-4 times a month     Drug use: Never     Sexual activity: Not on file   Lifestyle     Physical activity     Days per week: Not on file     Minutes per session: Not on file     Stress: Not on file   Relationships     Social connections     Talks on phone: Not on file     Gets together: Not on file     Attends Congregation service: Not on file     Active member of club or organization: Not on file     Attends meetings of clubs or organizations: Not on file     Relationship status: Not on file     Intimate partner violence     Fear of current or ex partner: Not on file     Emotionally abused: Not on file     Physically abused: Not on file     Forced sexual activity: Not on file   Other Topics Concern     Not on file   Social History Narrative     Not on file       No current outpatient medications on file.       Review Of Systems:  Skin: negative  Eyes: negative  Ears/Nose/Throat: deafness  Respiratory: No shortness of breath, dyspnea on exertion, cough, or hemoptysis  Cardiovascular: negative  Gastrointestinal: reflux  Genitourinary: negative  Musculoskeletal: negative  Neurologic: negative  Psychiatric: depression stable  Hematologic/Lymphatic/Immunologic: negative  Endocrine: diabetes    Exam:  /87   Pulse 75   Temp 98.3  F (36.8  C) (Oral)   Resp 16   Ht 1.676 m (5' 6\")   Wt 99.2 kg (218 lb 11.1 oz)   SpO2 94%   BMI 35.30 kg/m      General Impression: Pleasant gentleman who is not in distress and is " quite conversational but hard of hearing and well-oriented in time place and person.    Mental status.  Normal    HEENT: There is no clinical evidence of jaundice on examination of conjunctiva.  Extraocular mild movements are normal.  Mucous membranes unremarkable    Skin: Skin is otherwise normal to examine except in the left inguinal and scrotal area    Lymph Nodes: There is no regional lymphadenopathy    Respiratory System: The respiratory cycle is normal    Cardiovascular: There is no significant pitting peripheral edema    Abdominal: The abdomen is mildly obese, there is no tenderness on examination of the abdomen.  There is an inflamed area in the inguinal area extending into the scrotum with sign of a drainage site.  The lesion is still inflamed but is soft and not markedly tender at this point no further material is expressed from the site.  .    Extremities: Extremities are otherwise unremarkable    Back and Flank: Not examined    Genital: Examination of genitalia reveals a uncircumcised penis with a normal size meatus both testes are descended into the scrotum are nontender as well as the epididymis on either side.  There was some inflammation extending down into the upper part of the scrotum on the left side consistent with cellulitis as noted above, the drainage point was at the junction between the scrotum and the inguinal area on the left side.    Rectal: Not examined    Neurologic: There are no focal abnormal clinical neurological signs in the central, peripheral nervous systems    Impression: The patient's abscess has now drained, there is still evidence of cellulitis, the patient is now on intravenous Zosyn and I expect that this may well decline conservatively.  He is afebrile, the white cell count is normal, the lactic acid level is also normal.  It should continue to be dressed on a regular basis as that may be some more drainage I did not see any evidence of a drain, which was placed in the ER  but it must of been removed since.  I think it unlikely we will need to consider surgical intervention at this point and expect this to decline now with current treatment.  We will be very happy to see him again if there is further concern.  I went over the entire situation with the patient and the professional staff in detail today.  I answered all the questions    Plan: We will continue to monitor this but please call us if anything changes    A consultation included careful review of previous and current records, labs other pertinent studies discussion with professional staff patient and decision making

## 2020-09-01 NOTE — PLAN OF CARE
DATE & TIME: 8/31/2020 5297-6851  Cognitive Concerns/ Orientation : A&Ox4  BEHAVIOR & AGGRESSION TOOL COLOR: Green   CIWA SCORE: N/A   ABNL VS/O2: VSS on RA   MOBILITY: independent   PAIN MANAGMENT: Mild discomfort in L. Groin site; refused medications/ heat packs  DIET: NPO except ice chips   BOWEL/BLADDER: Continent, up to BR.  ABNL LAB/BG: Creatinine 1.31,    DRAIN/DEVICES: PIV infusing LR @ 100 mL/hr; IV antibiotics (zosyn and Vanco)  TELEMETRY RHYTHM: N/A    SKIN: Scattered bruising; intact; wound on L. Groin site (redness/slight bloody drainage, gauze provided)  TESTS/PROCEDURES: N/A  D/C DAY/GOALS/PLACE: pending discharge   OTHER IMPORTANT INFO: Urology to consult.

## 2020-09-01 NOTE — PLAN OF CARE
DATE & TIME: 8/31/2020 2884-9848      Cognitive Concerns/ Orientation : A&Ox4  BEHAVIOR & AGGRESSION TOOL COLOR: Green   CIWA SCORE: N/A   ABNL VS/O2: VSS on RA   MOBILITY: independent   PAIN MANAGMENT: Mild discomfort in L. Groin site;Received Tylenol  DIET: NPO except ice chips   BOWEL/BLADDER: Continent, up to BR.  ABNL LAB/BG: Hgb-12.1, BG-110, 108, 161  DRAIN/DEVICES: PIV infusing LR @ 100 mL/hr; IV antibiotics (Zosyn and Vanco)  TELEMETRY RHYTHM: N/A    SKIN: Scattered bruising; intact; wound on L. Groin site (redness/slight bloody drainage, covered with gauze)  TESTS/PROCEDURES: N/A  D/C DAY/GOALS/PLACE: pending discharge   OTHER IMPORTANT INFO: Seen by Urology. No surgical intervention.

## 2020-09-01 NOTE — PROGRESS NOTES
RECEIVING UNIT ED HANDOFF REVIEW        ED Nurse Handoff Report was reviewed by: Comfort Kaplan RN on August 31, 2020 at 8:59 PM

## 2020-09-01 NOTE — H&P
[facility]    History and Physical - Hospitalist Service       Date of Admission:  8/31/2020    Assessment & Plan   Luc Amaya is a 74 year old male admitted on 8/31/2020. He presents with scrotal pain and swelling.       Scrotal Abscess    Cellulitis of scrotum    Assessment: presents with 2 weeks of worsening scrotal pain and swelling. US shows Probable small abscess lateral left scrotal wall with possible communication to the skin surface. CT shows Probable left scrotal wall abscess. No gas is noted in the soft tissues to suggest gangrenous infection. Findings are consistent with recent testicular ultrasound findings. No intra-abdominal or intraperitoneal abnormality associated with this infection.    Plan:   - admit to inpatient  - IV Zosyn and Vancomycin for now  - Urology eval  - Follow UC  - NPO @ midnight  - Follow vitals/temp  - Pain control as needed      Gastroesophageal reflux disease without esophagitis    Assessment/Plan: PPI as needed      Major depressive disorder, recurrent episode, mild (H)    Assessment/Plan: stable      Mixed hyperlipidemia    Assessment/Plan: follow as outpatient      Type 2 diabetes mellitus with complication, without long-term current use of insulin (H)    Assessment: PTA on Metformin    Plan:   - hold PTA meformin  - sliding scale insulin as needed  - Hypoglycemia protocol       Diet: NPO @ midnight  DVT Prophylaxis: Pneumatic Compression Devices  Hardin Catheter: not present  Code Status: FULL CODE         Disposition Plan   Expected discharge: 2 - 3 days, recommended to prior living arrangement once adequate pain management/ tolerating PO medications and antibiotic plan established.  Entered: Patric Poole MD 08/31/2020, 8:15 PM     The patient's care was discussed with the Patient and ED Provider.    Patric Poole MD  [facility]    ______________________________________________________________________    Chief Complaint     Scrotal  Pain and Swelling    History is obtained from the patient    History of Present Illness      Luc Amaya is a 74 year old male with past medical history of diabetes mellitus, major depression, GERD presents for evaluation of scrotal pain and swelling.    Patient ports that for the past 2 weeks, left side of the scrotum has became progressively more painful and swollen.  He was seen in clinic 4 days prior to admission at which time he was started on Augmentin.  He reports that despite taking his antibiotic, his left scrotum continued to become more swollen, painful, and started bleeding and draining on the day of admission.  Ultimately he decided to present to the emergency department for further evaluation.  Patient otherwise denies any fevers or chills, no chest pain or shortness of breath, he denies any urinary changes.  He denies any blood in his stool, no weight loss or night sweats.  He denies any nausea/vomiting or abdominal pain.  He denies any headaches, vision changes, no localized weakness or numbness of extremities.  He denies any recent scrotal trauma.  He denies any history of significant urinary tract infections.  He denies any flank pain.  He otherwise has no other complaints this time.    Review of Systems    The 10 point Review of Systems is negative other than noted in the HPI or here.     Past Medical History    I have reviewed this patient's medical history and updated it with pertinent information if needed.   Past Medical History:   Diagnosis Date     DM (diabetes mellitus) (H)      GERD (gastroesophageal reflux disease)      Major depression        Past Surgical History   I have reviewed this patient's surgical history and updated it with pertinent information if needed.  History reviewed. No pertinent surgical history.    Social History   I have reviewed this patient's social history and updated it with pertinent information if needed.  Social History     Tobacco Use     Smoking status:  Former Smoker     Years: 40.00     Types: Cigarettes     Smokeless tobacco: Never Used   Substance Use Topics     Alcohol use: Yes     Frequency: 2-4 times a month     Drug use: Never       Family History   I have reviewed this patient's family history and updated it with pertinent information if needed.  Family History   Problem Relation Age of Onset     No Known Problems Mother      Heart Disease Father      Diabetes Father      Prior to Admission Medications   Prior to Admission Medications   Prescriptions Last Dose Informant Patient Reported? Taking?   Calcium-Magnesium-Zinc 333-133-5 MG TABS per tablet 8/31/2020 at Unknown time Self Yes Yes   Sig: Take 1 tablet by mouth daily   Ferrous Gluconate 324 (37.5 Fe) MG TABS 8/31/2020 at Unknown time Self Yes Yes   Sig: Take 324 mg by mouth daily   Vitamin D, Cholecalciferol, 25 MCG (1000 UT) TABS 8/31/2020 at Unknown time Self Yes Yes   Sig: Take 1,000 Units by mouth daily   amoxicillin-clavulanate (AUGMENTIN) 875-125 MG tablet 8/31/2020 at x1 Self Yes Yes   Sig: Take 1 tablet by mouth 2 times daily X 10 days, prescribed on 8/27/2020   aspirin 81 MG EC tablet 8/31/2020 at Unknown time Self Yes Yes   Sig: Take 81 mg by mouth daily   atorvastatin (LIPITOR) 10 MG tablet 8/31/2020 at Unknown time Self Yes Yes   Sig: Take 10 mg by mouth daily    escitalopram (LEXAPRO) 10 MG tablet 8/31/2020 at Unknown time Self Yes Yes   Sig: Take 10 mg by mouth daily    ibuprofen (ADVIL/MOTRIN) 200 MG tablet 8/31/2020 at x1 Self Yes Yes   Sig: Take 400 mg by mouth 2 times daily (with meals)   metFORMIN (GLUCOPHAGE) 500 MG tablet 8/31/2020 at Unknown time Self Yes Yes   Sig: Take 500 mg by mouth daily (with breakfast)   metFORMIN (GLUCOPHAGE) 500 MG tablet 8/30/2020 at Unknown time Self Yes Yes   Sig: Take 1,500 mg by mouth daily (with dinner) (3 tablets of 500mg)   multivitamin w/minerals (THERA-VIT-M) tablet 8/31/2020 at Unknown time Self Yes Yes   Sig: Take 1 tablet by mouth daily       Facility-Administered Medications: None     Allergies   No Known Allergies    Physical Exam   Vital Signs: Temp: 97.5  F (36.4  C) Temp src: Oral BP: 106/56 Pulse: 75   Resp: 14 SpO2: 94 % O2 Device: None (Room air)    Weight: 220 lbs 0 oz    Constitutional: awake, alert, cooperative, no apparent distress.   Eyes: Lids and lashes normal, pupils equal, round and reactive to light   ENT: Normocephalic, without obvious abnormality, atraumatic, sinuses nontender on palpation   Hematologic / Lymphatic: no cervical lymphadenopathy   Respiratory: CTABL   Cardiovascular: RRR with no m/r/g   GI: Normal bowel sounds, soft, non-distended, non-tender.   : Circumcized penis, at this time there is no penile discharge or rash/sores. No right testicular/scrotal tenderness. There is region of firm induration left upper scrotum and left perineal area. There is tenderness to palpation along left epididymus.   Skin: normal skin color, texture, turgor   Musculoskeletal: There is no redness, warmth, or swelling of the joints. Full range of motion noted.   Neurologic: Awake, alert, oriented to name, place and time. Cranial nerves II-XII are grossly intact. Motor is 5 out of 5 bilaterally. Sensory is intact.   Neuropsychiatric: normal mood and affect      Data   Data reviewed today: I reviewed all medications, new labs and imaging results over the last 24 hours. I personally reviewed the CT abdomen and US Scrotum image(s) showing see below.    Most Recent 3 CBC's:  Recent Labs   Lab Test 08/31/20  1526   WBC 7.3   HGB 13.9   MCV 96        Most Recent 3 BMP's:  Recent Labs   Lab Test 08/31/20  1526      POTASSIUM 4.4   CHLORIDE 109   CO2 26   BUN 19   CR 1.31*   ANIONGAP 7   RICKIE 9.8   *     Most Recent 2 LFT's:No lab results found.  Most Recent 3 INR's:No lab results found.  Most Recent Urinalysis:No lab results found.  Recent Results (from the past 24 hour(s))   US Testicular & Scrotum w Doppler Ltd    Narrative     US TESTICULAR AND SCROTUM WITH DOPPLER LIMITED 8/31/2020 4:53 PM     HISTORY: Left testicular swelling-abscess.    FINDINGS: The testicles bilaterally are homogeneous in echotexture  without focal mass. The right testicle measures 4.5 x 2.6 x 2.8 cm.  The left testicle measures 4.5 x 2.8 x 2.0 cm. Color Doppler waveform  analysis demonstrates normal arterial waveforms within the testicles.  No evidence of testicular torsion. The right epididymis is normal. The  left epididymis is normal. No varicoceles or hydroceles are evident.  There is a small complex fluid collection lateral to the left testicle  in the overlying thickened scrotal wall measuring 1.2 x 1.2 x 1.1 cm.  There appears to be some communication with the skin surface.      Impression    IMPRESSION:    1. Normal testicles without torsion or mass. No epididymitis or  hydroceles.  2. Probable small abscess lateral left scrotal wall with possible  communication to the skin surface.    VU GAMEZ MD   CT Abdomen Pelvis w Contrast    Narrative    CT ABDOMEN PELVIS WITH CONTRAST 8/31/2020 5:45 PM    CLINICAL HISTORY: Please go to mid-thigh with concern for Lobo  gangrene.    TECHNIQUE: CT scan of the abdomen and pelvis was performed following  injection of IV contrast. Multiplanar reformats were obtained. Dose  reduction techniques were used.  CONTRAST: 111mL Isovue-370    COMPARISON: Testicular ultrasound 8/31/2020.    FINDINGS:   LOWER CHEST: Normal.    HEPATOBILIARY: Cholelithiasis. Gallbladder is otherwise unremarkable.  Probable collapsing cyst lateral segment left hepatic lobe on series  4, image 31. Liver is otherwise within normal limits.    PANCREAS: Normal.    SPLEEN: Normal.    ADRENAL GLANDS: Normal.    KIDNEYS/BLADDER: Small probable cyst measuring 1.5 cm mid left kidney  on series 4, image 82. Kidneys, ureters and bladder are otherwise  unremarkable. No urinary tract calculi or hydronephrosis.    BOWEL: Probable mild colonic constipation. No  bowel obstruction or  diverticulitis. Appendix not visualized.    LYMPH NODES: Prominent upper abdominal lymph nodes are present but not  significantly enlarged by CT criteria. No enlarged retroperitoneal  lymph nodes. Mildly prominent left inguinal lymph node is present on  image 210 of series 4. Reactive left pelvic lymph nodes are also noted  but are not enlarged by CT criteria.    VASCULATURE: Calcified plaque abdominal aorta without aneurysm or  dissection.    PELVIC ORGANS: Bladder, prostate and rectum are unremarkable.    ADDITIONAL FINDINGS: Focal inflammation noted in the left scrotal wall  extending into the perineal region. 2.4 cm fluid collection noted in  this area on series 4, image 265 as described on testicular ultrasound  performed earlier. Finding is most consistent with a scrotal  wall/perineal abscess. The right scrotal wall and right lateral aspect  of the perineum is not significantly involved. No gas is noted in the  soft tissues to suggest gangrenous infection. Incidental lipoma noted  in the adductor musculature of the left proximal thigh.    MUSCULOSKELETAL: Bilateral L5 pars defects with grade 1  spondylolisthesis of L5 on S1 is noted.      Impression    IMPRESSION:   1.  Probable left scrotal wall abscess. No gas is noted in the soft  tissues to suggest gangrenous infection. Findings are consistent with  recent testicular ultrasound findings. No intra-abdominal or  intraperitoneal abnormality associated with this infection.    2.  Prominent upper abdominal lymph nodes and borderline-enlarged left  inguinal lymph node. Inguinal lymph node is likely reactive in nature.    3.  Cholelithiasis. Probable collapsing cyst lateral segment left  hepatic lobe.    4.  Simple-appearing cyst left renal cortex. No further follow-up  required.    5.  Colonic constipation but no bowel obstruction or diverticulitis.    VU GAMEZ MD

## 2020-09-01 NOTE — PROGRESS NOTES
Admission    Patient arrives to room 621-1 via cart from ED.  Care plan note: A & O x 4; VSS on RA; up independently. Scattered bruising, intact; L. Groin site red with slight drainage. Nursing will continue to monitor and assess.     Inpatient nursing criteria listed below were met:    PCD's Documented: Yes  Skin issues/needs documented :Yes  Isolation education started/completed Yes  Patient allergies verified with patient: Yes  Verified completion of [place] Risk Assessment Tool:  Yes  Verified completion of Guardianship screening tool: Yes  Fall Prevention: Care plan updated, Education given and documented Yes  Care Plan initiated: Yes  Home medications documented in belongings flowsheet: Yes  Patient belongings documented in belongings flowsheet: Yes  Reminder note (belongings/ medications) placed in discharge instructions:Yes  Admission profile/ required documentation complete: Yes  Bedside Report Letter given and explained to patient Yes    Comfort Kaplan, RN on 8/31/2020 at 10:00 PM

## 2020-09-01 NOTE — PROGRESS NOTES
[facility]    Medicine Progress Note - Hospitalist Service       Date of Admission:  8/31/2020  Date of Service: 09/01/2020    Assessment & Plan The email address for your [employer] account has been updated     Luc Amaya is a 74 year old male admitted on 8/31/2020. He presents with scrotal pain and swelling.       Scrotal Abscess    Cellulitis of scrotum     Assessment: presents with 2 weeks of worsening scrotal pain and swelling. US shows Probable small abscess lateral left scrotal wall with possible communication to the skin surface. CT shows Probable left scrotal wall abscess. No gas is noted in the soft tissues to suggest gangrenous infection. Findings are consistent with recent testicular ultrasound findings. No intra-abdominal or intraperitoneal abnormality associated with this infection.    Plan:   - IV Zosyn for now  - Vanc stopped   - Urology eval - > unlikely will need to consider surgical intervention at this point   - Follow UC  - Follow vitals/temp  - Pain control as needed      Gastroesophageal reflux disease without esophagitis    Assessment/Plan: PPI as needed      Major depressive disorder, recurrent episode, mild (H)    Assessment/Plan: stable      Mixed hyperlipidemia    Assessment/Plan: follow as outpatient      Type 2 diabetes mellitus with complication, without long-term current use of insulin (H)    Assessment: PTA on Metformin    Plan:   - hold PTA meformin  - sliding scale insulin as needed  - Hypoglycemia protocol         Diet: Moderate Consistent CHO Diet    DVT Prophylaxis: Pneumatic Compression Devices  Hardin Catheter: not present  Code Status: Full Code           Disposition Plan   Expected discharge: Tomorrow, recommended to prior living arrangement once adequate pain management/ tolerating PO medications and antibiotic plan established.  Entered: Patric Poole MD 09/01/2020, 6:33 PM       The patient's care was discussed with the Bedside Nurse, Patient and  Patient's Family.    Patric Poole MD  Hospitalist Service  [facility] [place]    ______________________________________________________________________    Interval History     Scrotal pain improved  No fevers or chills  No CP/SOB  No diarrhea, no nausea/vomiting or abdominal pain  No rashes  No new complaints.     Data reviewed today: I reviewed all medications, new labs and imaging results over the last 24 hours. I personally reviewed no images or EKG's today.    Physical Exam   Vital Signs: Temp: 97.6  F (36.4  C) Temp src: Oral BP: 134/89 Pulse: 74   Resp: 16 SpO2: 92 % O2 Device: None (Room air)    Weight: 218 lbs 11.14 oz    Constitutional: no apparent distress  Respiratory: CTABL   Cardiovascular: RRR with no m/r/g   GI: Normal bowel sounds, soft, non-distended, non-tender.   Musculoskeletal: There is no redness, warmth, or swelling of the joints.   Neurologic: Awake, alert, oriented to name, place and time.Motor is 5 out of 5 bilaterally. Sensory is intact.   Neuropsychiatric: normal mood and affect      Data   Recent Labs   Lab 09/01/20  0846 08/31/20  1526   WBC 5.0 7.3   HGB 12.1* 13.9   MCV 95 96    247    142   POTASSIUM 3.8 4.4   CHLORIDE 109 109   CO2 25 26   BUN 12 19   CR 1.05 1.31*   ANIONGAP 6 7   RICKIE 8.1* 9.8   * 151*     No results found for this or any previous visit (from the past 24 hour(s)).  Medications     lactated ringers 100 mL/hr at 09/01/20 1546       [START ON 9/2/2020] aspirin  81 mg Oral Daily     piperacillin-tazobactam  4.5 g Intravenous Q6H     sodium chloride (PF)  3 mL Intracatheter Q8H

## 2020-09-01 NOTE — PLAN OF CARE
DATE & TIME: 8/31/2020; [id number]  Cognitive Concerns/ Orientation : A & O x 4; calm and cooperative    BEHAVIOR & AGGRESSION TOOL COLOR: Green   CIWA SCORE: N/A   ABNL VS/O2: VSS on RA   MOBILITY: independent   PAIN MANAGMENT: denies; mild discomfort in L. Groin site; refused medications/ heat packs  DIET: NPO except ice chips/ meds  BOWEL/BLADDER: BS active x 4; continent   ABNL LAB/BG: Creatinine= 1.31: BG= 147   DRAIN/DEVICES: PIV infusing LR @ 100 mL/hr; IV antibiotics (zosyn and Vanco)  TELEMETRY RHYTHM: N/A    SKIN: Scattered bruising; intact; wound on L. Groin site (redness/slight bloody drainage, gauze provided)  TESTS/PROCEDURES: N/A  D/C DAY/GOALS/PLACE: pending discharge   OTHER IMPORTANT INFO: I/Os; urology following; nursing will continue to monitor and assess

## 2020-09-01 NOTE — PHARMACY-VANCOMYCIN DOSING SERVICE
Pharmacy Vancomycin Initial Note  Date of Service 2020  Patient's  1945  74 year old, male    Indication: Sepsis    Current estimated CrCl = Estimated Creatinine Clearance: 54.7 mL/min (A) (based on SCr of 1.31 mg/dL (H)).    Creatinine for last 3 days  2020:  3:26 PM Creatinine 1.31 mg/dL    Recent Vancomycin Level(s) for last 3 days  No results found for requested labs within last 72 hours.      Vancomycin IV Administrations (past 72 hours)                   vancomycin 2000 mg in 0.9% NaCl 500 ml intermittent infusion 2,000 mg (mg) 2,000 mg Given 20 1711                Nephrotoxins and other renal medications (From now, onward)    Start     Dose/Rate Route Frequency Ordered Stop    20 0500  vancomycin 2000 mg in 0.9% NaCl 500 ml intermittent infusion 2,000 mg      2,000 mg  over 90 Minutes Intravenous EVERY 12 HOURS 20 2143      20 2300  piperacillin-tazobactam (ZOSYN) 3.375 g vial to attach to  mL bag      3.375 g  over 30 Minutes Intravenous EVERY 6 HOURS 20 2121            Contrast Orders - past 72 hours (72h ago, onward)    Start     Dose/Rate Route Frequency Ordered Stop    20 1720  iopamidol (ISOVUE-370) solution 111 mL      111 mL Intravenous ONCE 20 1715 20 1730                Plan:  1.  Start vancomycin  2000 mg IV q12h.   2.  Goal Trough Level: 15-20 mg/L   3.  Pharmacy will check trough levels as appropriate in 1-3 Days.    4. Serum creatinine levels will be ordered daily for the first week of therapy and at least twice weekly for subsequent weeks.    5. [place] method utilized to dose vancomycin therapy: Method 1    Armani Capone [facility]

## 2020-09-02 VITALS
DIASTOLIC BLOOD PRESSURE: 100 MMHG | BODY MASS INDEX: 35.15 KG/M2 | OXYGEN SATURATION: 93 % | HEIGHT: 66 IN | HEART RATE: 77 BPM | SYSTOLIC BLOOD PRESSURE: 146 MMHG | TEMPERATURE: 98.2 F | WEIGHT: 218.7 LBS | RESPIRATION RATE: 16 BRPM

## 2020-09-02 LAB
CREAT SERPL-MCNC: 1.05 MG/DL (ref 0.66–1.25)
GFR SERPL CREATININE-BSD FRML MDRD: 69 ML/MIN/{1.73_M2}
GLUCOSE BLDC GLUCOMTR-MCNC: 125 MG/DL (ref 70–99)
SARS-COV-2 RNA SPEC QL NAA+PROBE: NOT DETECTED
SPECIMEN SOURCE: NORMAL

## 2020-09-02 PROCEDURE — 99232 SBSQ HOSP IP/OBS MODERATE 35: CPT | Performed by: UROLOGY

## 2020-09-02 PROCEDURE — 36415 COLL VENOUS BLD VENIPUNCTURE: CPT | Performed by: STUDENT IN AN ORGANIZED HEALTH CARE EDUCATION/TRAINING PROGRAM

## 2020-09-02 PROCEDURE — 00000146 ZZHCL STATISTIC GLUCOSE BY METER IP

## 2020-09-02 PROCEDURE — 25000128 H RX IP 250 OP 636: Performed by: STUDENT IN AN ORGANIZED HEALTH CARE EDUCATION/TRAINING PROGRAM

## 2020-09-02 PROCEDURE — 99239 HOSP IP/OBS DSCHRG MGMT >30: CPT | Performed by: STUDENT IN AN ORGANIZED HEALTH CARE EDUCATION/TRAINING PROGRAM

## 2020-09-02 PROCEDURE — 25800030 ZZH RX IP 258 OP 636: Performed by: STUDENT IN AN ORGANIZED HEALTH CARE EDUCATION/TRAINING PROGRAM

## 2020-09-02 PROCEDURE — 82565 ASSAY OF CREATININE: CPT | Performed by: STUDENT IN AN ORGANIZED HEALTH CARE EDUCATION/TRAINING PROGRAM

## 2020-09-02 PROCEDURE — 25000132 ZZH RX MED GY IP 250 OP 250 PS 637: Mod: GY | Performed by: STUDENT IN AN ORGANIZED HEALTH CARE EDUCATION/TRAINING PROGRAM

## 2020-09-02 RX ADMIN — SODIUM CHLORIDE, POTASSIUM CHLORIDE, SODIUM LACTATE AND CALCIUM CHLORIDE: 600; 310; 30; 20 INJECTION, SOLUTION INTRAVENOUS at 06:03

## 2020-09-02 RX ADMIN — ACETAMINOPHEN 650 MG: 325 TABLET ORAL at 09:08

## 2020-09-02 RX ADMIN — PIPERACILLIN SODIUM AND TAZOBACTAM SODIUM 4.5 G: 4; .5 INJECTION, POWDER, LYOPHILIZED, FOR SOLUTION INTRAVENOUS at 06:00

## 2020-09-02 RX ADMIN — PIPERACILLIN SODIUM AND TAZOBACTAM SODIUM 4.5 G: 4; .5 INJECTION, POWDER, LYOPHILIZED, FOR SOLUTION INTRAVENOUS at 10:43

## 2020-09-02 RX ADMIN — ASPIRIN 81 MG: 81 TABLET, COATED ORAL at 09:05

## 2020-09-02 ASSESSMENT — ACTIVITIES OF DAILY LIVING (ADL)
ADLS_ACUITY_SCORE: 12

## 2020-09-02 NOTE — PROGRESS NOTES
History: This very pleasant 74-year-old gentleman has been admitted because of a scrotal/left inguinal abscess which was drained in the emergency room.  He has been on intravenous antibiotic therapy he also has type 2 diabetes.  He is afebrile at this time.  The white cell count is normal.  The patient is not in pain and feels well      Past Medical History:   Diagnosis Date     DM (diabetes mellitus) (H)      GERD (gastroesophageal reflux disease)      Major depression        Social History     Socioeconomic History     Marital status:      Spouse name: None     Number of children: None     Years of education: None     Highest education level: None   Occupational History     None   Social Needs     Financial resource strain: None     Food insecurity     Worry: None     Inability: None     Transportation needs     Medical: None     Non-medical: None   Tobacco Use     Smoking status: Former Smoker     Years: 40.00     Types: Cigarettes     Smokeless tobacco: Never Used   Substance and Sexual Activity     Alcohol use: Yes     Frequency: 2-4 times a month     Drug use: Never     Sexual activity: None   Lifestyle     Physical activity     Days per week: None     Minutes per session: None     Stress: None   Relationships     Social connections     Talks on phone: None     Gets together: None     Attends [employer] service: None     Active member of club or organization: None     Attends meetings of clubs or organizations: None     Relationship status: None     Intimate partner violence     Fear of current or ex partner: None     Emotionally abused: None     Physically abused: None     Forced sexual activity: None   Other Topics Concern     None   Social History Narrative     None       History reviewed. No pertinent surgical history.    Family History   Problem Relation Age of Onset     No Known Problems Mother      Heart Disease Father      Diabetes Father          Current Facility-Administered Medications:       acetaminophen (TYLENOL) tablet 650 mg, 650 mg, Oral, Q4H PRN, Patric Poole MD, 650 mg at 09/02/20 0908     aspirin EC tablet 81 mg, 81 mg, Oral, Daily, Patric Poole MD, 81 mg at 09/02/20 0905     glucose gel 15-30 g, 15-30 g, Oral, Q15 Min PRN **OR** dextrose 50 % injection 25-50 mL, 25-50 mL, Intravenous, Q15 Min PRN **OR** glucagon injection 1 mg, 1 mg, Subcutaneous, Q15 Min PRN, Patric Poole MD     lactated ringers infusion, , Intravenous, Continuous, Patric Poole MD, Last Rate: 100 mL/hr at 09/02/20 0603     lidocaine (LMX4) cream, , Topical, Q1H PRN, Patric Poole MD     lidocaine 1 % 0.1-1 mL, 0.1-1 mL, Other, Q1H PRN, Patric Poole MD     melatonin tablet 1 mg, 1 mg, Oral, At Bedtime PRN, Patric Poole MD     naloxone (NARCAN) injection 0.1-0.4 mg, 0.1-0.4 mg, Intravenous, Q2 Min PRN, Patric Poole MD     ondansetron (ZOFRAN-ODT) ODT tab 4 mg, 4 mg, Oral, Q6H PRN **OR** ondansetron (ZOFRAN) injection 4 mg, 4 mg, Intravenous, Q6H PRN, Patric Poole MD     oxyCODONE (ROXICODONE) tablet 5-10 mg, 5-10 mg, Oral, Q4H PRN, Patric Poole MD     piperacillin-tazobactam (ZOSYN) 4.5 g vial to attach to  mL bag, 4.5 g, Intravenous, Q6H, Patric Poole MD, Last Rate: 200 mL/hr at 09/01/20 1143, 4.5 g at 09/02/20 0600     prochlorperazine (COMPAZINE) injection 5 mg, 5 mg, Intravenous, Q6H PRN **OR** prochlorperazine (COMPAZINE) tablet 5 mg, 5 mg, Oral, Q6H PRN **OR** prochlorperazine (COMPAZINE) suppository 12.5 mg, 12.5 mg, Rectal, Q12H PRN, Patric Poole MD     sodium chloride (PF) 0.9% PF flush 3 mL, 3 mL, Intracatheter, q1 min prn, Patric Poole MD     sodium chloride (PF) 0.9% PF flush 3 mL, 3 mL, Intracatheter, Q8H, Patric Poole MD, 3 mL at 09/02/20 0600    10 point ROS of systems including Constitutional, Eyes, Respiratory, Cardiovascular, Gastroenterology, Genitourinary, Integumentary, Muscularskeletal, Psychiatric and Neurologic were all negative except for pertinent positives noted in my HPI.    Examination:   BP (!)  "146/100 (BP Location: Right arm)   Pulse 77   Temp 98.2  F (36.8  C) (Oral)   Resp 16   Ht 1.676 m (5' 6\")   Wt 99.2 kg (218 lb 11.1 oz)   SpO2 93%   BMI 35.30 kg/m    General Impression: Very pleasant patient in no acute distress, well-oriented in time place and person and quite conversational  Mental Status: Normal  HEENT: Extraocular movements intact.  No clinical evidence of jaundice on examination of eyes.  Mucous membranes are unremarkable  Skin: Warm.  No other abnormalities  Respiratory System: Unlabored on room air.  Respiratory cycle normal  Lymph Nodes: There is no inguinal lymphadenopathy detected  Back/Flank Tenderness: Not examined  Cardiovascular System: No significant peripheral pitting edema  Abdominal Examination: The abdomen is mildly obese but there are no other significant features other than the area of inflammation in the left scrotal/groin area.  This is now soft although still a little inflamed and certainly looks more softer than it was yesterday.  There is some serous/serosanguineous drainage only onto the dressing but it is fairly scant.  Extremities: Otherwise unremarkable  Genitial: Penis is not swollen and the genitalia otherwise  unremarkable  Rectal Examination: Not examined  Neurologic System: There are no significant acute abnormal neurological signs in the central or peripheral nervous systems    Impression: The patient is making excellent progress.  Provided my colleagues in internal medicine I happy with his progress I think he could go home today he should be on antibiotics by mouth at this stage if we have cultures to support that and he can go home with oral antibiotics and will follow him up in about 2 weeks in the office this to make sure this is healed adequately.  I recommend that he see our nurse practitioner Karen Mason at that time.  I carefully explained the entire situation to the patient in detail today.  I would anticipate his discharge today unless there " are other concerns.  I answered all his questions    Plan: Probable discharge today follow-up with ours in the office in about 2 weeks    This involve careful review of records discussion with staff examination discussion of options discussion of appropriateness for discharge and decision making

## 2020-09-02 NOTE — DISCHARGE INSTRUCTIONS
You will continue your antibiotic for 2 weeks.  You have some of  the Augmentin at home.   Your prescription was sent to your [facility] pharmacy so you can  the remainder of what is needed.

## 2020-09-02 NOTE — PLAN OF CARE
DATE & TIME: 9/2/2020 1900-0730  Cognitive Concerns/ Orientation : A&Ox4  BEHAVIOR & AGGRESSION TOOL COLOR: Green   CIWA SCORE: N/A   ABNL VS/O2: VSS on RA   MOBILITY: independent   PAIN MANAGMENT: denies; mild discomfort in L. Groin site; Tylenol PRN given x1  DIET: mod carb  BOWEL/BLADDER: BS active x 4; continent   ABNL LAB/BG:   DRAIN/DEVICES: PIV infusing LR @ 100 mL/hr; IV antibiotics (zosyn and Vanco)  TELEMETRY RHYTHM: N/A    SKIN: Scattered bruising; intact; wound on L. Groin site (redness/slight bloody drainage, gauze provided)  TESTS/PROCEDURES: N/A  D/C DAY/GOALS/PLACE: pending progress  OTHER IMPORTANT INFO: No surgical intervention per urology for now.

## 2020-09-02 NOTE — PROGRESS NOTES
Pt was found walking the [address] after pulling out IV. Pt was redirected back to room. Vitals and neuro done. 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 Pt was A&Ox4 and neuros intact in 10-15 mins. Will leave bed alarm on for the night and reassess in the AM.

## 2020-09-02 NOTE — PLAN OF CARE
Discharge    Patient discharged to home via private car with spouse, w/c assit to exit door  Care plan note:  up ad crista; improving abscess to L scrotal abscess; small purulent drainage; dressing held in place my stretchy disposable briefs.  Tenderness, tylenol given. Good intake.      Listed belongings gathered and returned to patient. Yes  Care Plan and Patient education resolved: Yes  Prescriptions if needed, hard copies sent with patient  has Augmentin at home, pharmacy will call in remainder to his [facility] pharmacy for him to .  Home and hospital acquired medications returned to patient: NA  Medication Bin checked and emptied on discharge Yes  Follow up appointment made for patient: Yes, for PCP and Urology

## 2020-09-06 LAB
BACTERIA SPEC CULT: NO GROWTH
BACTERIA SPEC CULT: NO GROWTH
SPECIMEN SOURCE: NORMAL
SPECIMEN SOURCE: NORMAL

## 2025-06-26 ENCOUNTER — ANCILLARY PROCEDURE (OUTPATIENT)
Dept: ULTRASOUND IMAGING | Facility: CLINIC | Age: 80
End: 2025-06-26
Attending: NURSE PRACTITIONER
Payer: MEDICARE

## 2025-06-26 DIAGNOSIS — N18.4 CHRONIC KIDNEY DISEASE, STAGE IV (SEVERE) (H): ICD-10-CM

## 2025-06-26 PROCEDURE — 76770 US EXAM ABDO BACK WALL COMP: CPT
